# Patient Record
Sex: MALE | Race: OTHER | HISPANIC OR LATINO
[De-identification: names, ages, dates, MRNs, and addresses within clinical notes are randomized per-mention and may not be internally consistent; named-entity substitution may affect disease eponyms.]

---

## 2018-07-26 ENCOUNTER — RESULT REVIEW (OUTPATIENT)
Age: 53
End: 2018-07-26

## 2018-07-26 ENCOUNTER — OUTPATIENT (OUTPATIENT)
Dept: OUTPATIENT SERVICES | Facility: HOSPITAL | Age: 53
LOS: 1 days | Discharge: HOME | End: 2018-07-26

## 2018-07-31 LAB — SURGICAL PATHOLOGY STUDY: SIGNIFICANT CHANGE UP

## 2019-01-27 ENCOUNTER — TRANSCRIPTION ENCOUNTER (OUTPATIENT)
Age: 54
End: 2019-01-27

## 2020-04-05 ENCOUNTER — TRANSCRIPTION ENCOUNTER (OUTPATIENT)
Age: 55
End: 2020-04-05

## 2020-04-05 ENCOUNTER — INPATIENT (INPATIENT)
Facility: HOSPITAL | Age: 55
LOS: 0 days | Discharge: LEFT BEFORE DISCHARGE | DRG: 177 | End: 2020-04-05
Attending: INTERNAL MEDICINE | Admitting: INTERNAL MEDICINE
Payer: COMMERCIAL

## 2020-04-05 VITALS
DIASTOLIC BLOOD PRESSURE: 75 MMHG | OXYGEN SATURATION: 100 % | SYSTOLIC BLOOD PRESSURE: 120 MMHG | RESPIRATION RATE: 17 BRPM | TEMPERATURE: 98 F | HEART RATE: 82 BPM

## 2020-04-05 VITALS
TEMPERATURE: 97 F | HEIGHT: 66 IN | WEIGHT: 134.92 LBS | RESPIRATION RATE: 18 BRPM | SYSTOLIC BLOOD PRESSURE: 125 MMHG | DIASTOLIC BLOOD PRESSURE: 86 MMHG | OXYGEN SATURATION: 100 % | HEART RATE: 68 BPM

## 2020-04-05 DIAGNOSIS — J18.9 PNEUMONIA, UNSPECIFIED ORGANISM: ICD-10-CM

## 2020-04-05 DIAGNOSIS — Z29.9 ENCOUNTER FOR PROPHYLACTIC MEASURES, UNSPECIFIED: ICD-10-CM

## 2020-04-05 DIAGNOSIS — R10.9 UNSPECIFIED ABDOMINAL PAIN: ICD-10-CM

## 2020-04-05 LAB
ALBUMIN SERPL ELPH-MCNC: 3.5 G/DL — SIGNIFICANT CHANGE UP (ref 3.5–5)
ALP SERPL-CCNC: 72 U/L — SIGNIFICANT CHANGE UP (ref 40–120)
ALT FLD-CCNC: 33 U/L DA — SIGNIFICANT CHANGE UP (ref 10–60)
ANION GAP SERPL CALC-SCNC: 4 MMOL/L — LOW (ref 5–17)
AST SERPL-CCNC: 23 U/L — SIGNIFICANT CHANGE UP (ref 10–40)
BASOPHILS # BLD AUTO: 0.02 K/UL — SIGNIFICANT CHANGE UP (ref 0–0.2)
BASOPHILS NFR BLD AUTO: 0.3 % — SIGNIFICANT CHANGE UP (ref 0–2)
BILIRUB SERPL-MCNC: 0.3 MG/DL — SIGNIFICANT CHANGE UP (ref 0.2–1.2)
BUN SERPL-MCNC: 9 MG/DL — SIGNIFICANT CHANGE UP (ref 7–18)
CALCIUM SERPL-MCNC: 9.2 MG/DL — SIGNIFICANT CHANGE UP (ref 8.4–10.5)
CHLORIDE SERPL-SCNC: 105 MMOL/L — SIGNIFICANT CHANGE UP (ref 96–108)
CO2 SERPL-SCNC: 29 MMOL/L — SIGNIFICANT CHANGE UP (ref 22–31)
CREAT SERPL-MCNC: 0.88 MG/DL — SIGNIFICANT CHANGE UP (ref 0.5–1.3)
EOSINOPHIL # BLD AUTO: 0.02 K/UL — SIGNIFICANT CHANGE UP (ref 0–0.5)
EOSINOPHIL NFR BLD AUTO: 0.3 % — SIGNIFICANT CHANGE UP (ref 0–6)
GLUCOSE SERPL-MCNC: 98 MG/DL — SIGNIFICANT CHANGE UP (ref 70–99)
HCT VFR BLD CALC: 48.5 % — SIGNIFICANT CHANGE UP (ref 39–50)
HGB BLD-MCNC: 16.4 G/DL — SIGNIFICANT CHANGE UP (ref 13–17)
IMM GRANULOCYTES NFR BLD AUTO: 0.7 % — SIGNIFICANT CHANGE UP (ref 0–1.5)
LIDOCAIN IGE QN: 1676 U/L — HIGH (ref 73–393)
LYMPHOCYTES # BLD AUTO: 0.93 K/UL — LOW (ref 1–3.3)
LYMPHOCYTES # BLD AUTO: 13.4 % — SIGNIFICANT CHANGE UP (ref 13–44)
MCHC RBC-ENTMCNC: 32.3 PG — SIGNIFICANT CHANGE UP (ref 27–34)
MCHC RBC-ENTMCNC: 33.8 GM/DL — SIGNIFICANT CHANGE UP (ref 32–36)
MCV RBC AUTO: 95.7 FL — SIGNIFICANT CHANGE UP (ref 80–100)
MONOCYTES # BLD AUTO: 0.66 K/UL — SIGNIFICANT CHANGE UP (ref 0–0.9)
MONOCYTES NFR BLD AUTO: 9.5 % — SIGNIFICANT CHANGE UP (ref 2–14)
NEUTROPHILS # BLD AUTO: 5.26 K/UL — SIGNIFICANT CHANGE UP (ref 1.8–7.4)
NEUTROPHILS NFR BLD AUTO: 75.8 % — SIGNIFICANT CHANGE UP (ref 43–77)
NRBC # BLD: 0 /100 WBCS — SIGNIFICANT CHANGE UP (ref 0–0)
PLATELET # BLD AUTO: 231 K/UL — SIGNIFICANT CHANGE UP (ref 150–400)
POTASSIUM SERPL-MCNC: 4.4 MMOL/L — SIGNIFICANT CHANGE UP (ref 3.5–5.3)
POTASSIUM SERPL-SCNC: 4.4 MMOL/L — SIGNIFICANT CHANGE UP (ref 3.5–5.3)
PROT SERPL-MCNC: 7.6 G/DL — SIGNIFICANT CHANGE UP (ref 6–8.3)
RBC # BLD: 5.07 M/UL — SIGNIFICANT CHANGE UP (ref 4.2–5.8)
RBC # FLD: 13.6 % — SIGNIFICANT CHANGE UP (ref 10.3–14.5)
SODIUM SERPL-SCNC: 138 MMOL/L — SIGNIFICANT CHANGE UP (ref 135–145)
WBC # BLD: 6.94 K/UL — SIGNIFICANT CHANGE UP (ref 3.8–10.5)
WBC # FLD AUTO: 6.94 K/UL — SIGNIFICANT CHANGE UP (ref 3.8–10.5)

## 2020-04-05 PROCEDURE — 96374 THER/PROPH/DIAG INJ IV PUSH: CPT

## 2020-04-05 PROCEDURE — 80053 COMPREHEN METABOLIC PANEL: CPT

## 2020-04-05 PROCEDURE — 84484 ASSAY OF TROPONIN QUANT: CPT

## 2020-04-05 PROCEDURE — 71250 CT THORAX DX C-: CPT | Mod: 26

## 2020-04-05 PROCEDURE — 85027 COMPLETE CBC AUTOMATED: CPT

## 2020-04-05 PROCEDURE — 93005 ELECTROCARDIOGRAM TRACING: CPT

## 2020-04-05 PROCEDURE — 99285 EMERGENCY DEPT VISIT HI MDM: CPT | Mod: 25

## 2020-04-05 PROCEDURE — 83690 ASSAY OF LIPASE: CPT

## 2020-04-05 PROCEDURE — 99285 EMERGENCY DEPT VISIT HI MDM: CPT

## 2020-04-05 PROCEDURE — 74177 CT ABD & PELVIS W/CONTRAST: CPT

## 2020-04-05 PROCEDURE — 87635 SARS-COV-2 COVID-19 AMP PRB: CPT

## 2020-04-05 PROCEDURE — 71250 CT THORAX DX C-: CPT

## 2020-04-05 PROCEDURE — 71045 X-RAY EXAM CHEST 1 VIEW: CPT

## 2020-04-05 PROCEDURE — 71045 X-RAY EXAM CHEST 1 VIEW: CPT | Mod: 26

## 2020-04-05 PROCEDURE — 74177 CT ABD & PELVIS W/CONTRAST: CPT | Mod: 26

## 2020-04-05 PROCEDURE — 36415 COLL VENOUS BLD VENIPUNCTURE: CPT

## 2020-04-05 RX ORDER — ENOXAPARIN SODIUM 100 MG/ML
40 INJECTION SUBCUTANEOUS DAILY
Refills: 0 | Status: DISCONTINUED | OUTPATIENT
Start: 2020-04-05 | End: 2020-04-05

## 2020-04-05 RX ORDER — SODIUM CHLORIDE 9 MG/ML
1000 INJECTION INTRAMUSCULAR; INTRAVENOUS; SUBCUTANEOUS ONCE
Refills: 0 | Status: COMPLETED | OUTPATIENT
Start: 2020-04-05 | End: 2020-04-05

## 2020-04-05 RX ORDER — ONDANSETRON 8 MG/1
4 TABLET, FILM COATED ORAL EVERY 4 HOURS
Refills: 0 | Status: DISCONTINUED | OUTPATIENT
Start: 2020-04-05 | End: 2020-04-05

## 2020-04-05 RX ORDER — ACETAMINOPHEN 500 MG
650 TABLET ORAL EVERY 6 HOURS
Refills: 0 | Status: DISCONTINUED | OUTPATIENT
Start: 2020-04-05 | End: 2020-04-05

## 2020-04-05 RX ORDER — ACETAMINOPHEN 500 MG
650 TABLET ORAL ONCE
Refills: 0 | Status: COMPLETED | OUTPATIENT
Start: 2020-04-05 | End: 2020-04-05

## 2020-04-05 RX ORDER — METOCLOPRAMIDE HCL 10 MG
10 TABLET ORAL ONCE
Refills: 0 | Status: COMPLETED | OUTPATIENT
Start: 2020-04-05 | End: 2020-04-05

## 2020-04-05 RX ADMIN — SODIUM CHLORIDE 1000 MILLILITER(S): 9 INJECTION INTRAMUSCULAR; INTRAVENOUS; SUBCUTANEOUS at 12:58

## 2020-04-05 RX ADMIN — SODIUM CHLORIDE 1000 MILLILITER(S): 9 INJECTION INTRAMUSCULAR; INTRAVENOUS; SUBCUTANEOUS at 11:58

## 2020-04-05 RX ADMIN — Medication 10 MILLIGRAM(S): at 11:58

## 2020-04-05 RX ADMIN — Medication 650 MILLIGRAM(S): at 11:58

## 2020-04-05 NOTE — DISCHARGE NOTE PROVIDER - HOSPITAL COURSE
Patient is a 54 year old male from home alert and oriented x3, with no PMH, who came in to the ED due to headache and body pains. Patient states that he has been feeling these symptoms on and off for the past 2 weeks. Patient denies any shortness of breath or cough. Denies any recent travel. Patient states that his wife was sick at home recently but is getting better. 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 Denies any diarrhea, constipation, or abdominal pain.        Lipase noted to be 1676. CT abdomen showing no acute pathology. No signs of pancreatitis. CT chest done showing bibasilar groundglass and interstitial infiltrates. Concern for COVID. Patient saturated well on room air.         Patient wanted to leave against medical advice. Patient advised the risks of leaving, including death. Patient understood the risks and still wanted to leave. Patient left AMA.

## 2020-04-05 NOTE — H&P ADULT - PROBLEM SELECTOR PLAN 2
pain likely from suspected infection  denies any abdominal pain at this time after being given tylenol  zofran and tylenol PRN   CT abd done showing no signs of pancreatitis  lipase of 1676 on admission  f/u lipase in AM

## 2020-04-05 NOTE — H&P ADULT - ASSESSMENT
Patient is a 54 year old male from home alert and oriented x3, with no PMH, who came in to the ED due to headache and body pains.    Lipase noted to be 1676. CXR was negative for acute pathology. CT chest done showed nonspecific bibasilar groundglass and interstitial infiltrates. CT abdomen done showed no acute processes. No pancreatitis. Given 1L NS bolus. Given dose of tylenol 650mg and reglan 10mg in ED.     Admitted for rule out COVID.

## 2020-04-05 NOTE — H&P ADULT - PROBLEM SELECTOR PLAN 1
patient presented with headache and diffuse body pains  patient denies any SOB or cough  CT chest done showing bibasilar groundglass infiltrates  concern for COVID  currently saturating at 100% on room air   f/u COVID results

## 2020-04-05 NOTE — ED PROVIDER NOTE - OBJECTIVE STATEMENT
55 y/o M pt with no significant PMHx presents to the ED c/o x10 days of frontal and facial headache. Patient notes x2 days of nausea, generalized weakness, mild abdominal and chest pressure, body aches. Patient denies any cough, fever, shortness of breath, recent travel or any other complaints. Patient relates sick contact at work and wife at home.

## 2020-04-05 NOTE — H&P ADULT - HISTORY OF PRESENT ILLNESS
Patient is a 54 year old male Patient is a 54 year old male from home alert and oriented x3, with no PMH, who came in to the ED due to headache and body pains. Patient states that he has been feeling these symptoms on and off for the past 2 weeks. Patient denies any shortness of breath or cough. Denies any recent travel or Patient is a 54 year old male from home alert and oriented x3, with no PMH, who came in to the ED due to headache and body pains. Patient states that he has been feeling these symptoms on and off for the past 2 weeks. Patient denies any shortness of breath or cough. Denies any recent travel. Patient states that his wife was sick at home recently but is getting better. 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 Denies any diarrhea, constipation, or abdominal pain.

## 2020-04-05 NOTE — ED ADULT NURSE REASSESSMENT NOTE - NS ED NURSE REASSESS COMMENT FT1
Report received from GENNA Fierro @[id number]. Pt resting in bed, denies chest pain, no shortness of breath indicated. no acute distress noted, MD at bedside. Safety maintained.

## 2020-04-05 NOTE — ED PROVIDER NOTE - CARE PLAN
Principal Discharge DX:	Pneumonia of both lower lobes due to infectious organism Principal Discharge DX:	Pneumonia of both lower lobes due to infectious organism  Secondary Diagnosis:	Acute pancreatitis, unspecified complication status, unspecified pancreatitis type

## 2020-04-05 NOTE — H&P ADULT - PROBLEM SELECTOR PLAN 3
IMPROVE VTE Individual Risk Assessment  RISK                                                                Points  [  ] Previous VTE                                                  3  [  ] Thrombophilia                                               2  [  ] Lower limb paralysis                                      2        (unable to hold up >15 seconds)    [  ] Current Cancer                                              2         (within 6 months)  [  ] Immobilization > 24 hrs                                1  [  ] ICU/CCU stay > 24 hours                              1  [  ] Age > 60                                                      1    IMPROVE VTE Score 0  lovenox for DVT prophylaxis

## 2020-04-05 NOTE — DISCHARGE NOTE PROVIDER - NSDCCPCAREPLAN_GEN_ALL_CORE_FT
PRINCIPAL DISCHARGE DIAGNOSIS  Diagnosis: Pneumonia of both lower lobes due to infectious organism  Assessment and Plan of Treatment: Patient came in to the ED due to headache and body pains.  Lipase noted to be 1676. CT abdomen showing no acute pathology. No signs of pancreatitis. CT chest done showing bibasilar groundglass and interstitial infiltrates. Concern for COVID. Patient saturated well on room air.   Patient wanted to leave against medical advice. Patient advised the risks of leaving, including death. Patient understood the risks and still wanted to leave. Patient left AMA. Patient advised to come back to hospital if developing shortness of breath and/or temperatures more than 104F.

## 2020-04-07 LAB — SARS-COV-2 RNA SPEC QL NAA+PROBE: (no result)

## 2020-04-30 NOTE — CHART NOTE - NSCHARTNOTEFT_GEN_A_CORE
COVID-19 PCR . (04.05.20 @ 11:40)    COVID-19 PCR: Positive for 2019-nCoV NOTE: The COVID-19 assay has been cleared by the U.S. Food and Drug  Administration under the Emergency Use Authorization (EUA). [facility] is designated as a high complexity laboratory by the Clinical  Laboratory Improvement Amendments of 1988(CLIA) and is qualified to  perform  this test. ASSAY INFORMATION: Real Time RT-PCR  Performed By:  [facility]  [facility] [address] [address]  Jacob Villatoro M.D.    covid test found to be positive noted above COVID-19 PCR . (04.05.20 @ 11:40)    COVID-19 PCR: Positive for 2019-nCoV NOTE: The COVID-19 assay has been cleared by the U.S. Food and Drug  Administration under the Emergency Use Authorization (EUA). [facility] is designated as a high complexity laboratory by the Clinical  Laboratory Improvement Amendments of 1988(CLIA) and is qualified to  perform  this test. ASSAY INFORMATION: Real Time RT-PCR  Performed By:  [facility]  [facility] [address] [address]  Jacob Villatoro M.D.    synptoms present on admission likely 2nd to pna 2nd to covid-19 that was found to be positive noted above

## 2021-06-08 ENCOUNTER — TRANSCRIPTION ENCOUNTER (OUTPATIENT)
Age: 56
End: 2021-06-08